# Patient Record
Sex: FEMALE | URBAN - METROPOLITAN AREA
[De-identification: names, ages, dates, MRNs, and addresses within clinical notes are randomized per-mention and may not be internally consistent; named-entity substitution may affect disease eponyms.]

---

## 2018-09-06 ENCOUNTER — CONSULT (OUTPATIENT)
Dept: URBAN - METROPOLITAN AREA CLINIC 19 | Facility: CLINIC | Age: 68
Setting detail: DERMATOLOGY
End: 2018-09-06

## 2018-09-06 DIAGNOSIS — L90.5 SCAR CONDITIONS AND FIBROSIS OF SKIN: ICD-10-CM

## 2018-09-06 DIAGNOSIS — Z85.828 PERSONAL HISTORY OF OTHER MALIGNANT NEOPLASM OF SKIN: ICD-10-CM

## 2018-09-06 DIAGNOSIS — L82.1 OTHER SEBORRHEIC KERATOSIS: ICD-10-CM

## 2018-09-06 DIAGNOSIS — Z85.820 PERSONAL HISTORY OF MALIGNANT MELANOMA OF SKIN: ICD-10-CM

## 2018-09-06 DIAGNOSIS — D18.01 HEMANGIOMA OF SKIN AND SUBCUTANEOUS TISSUE: ICD-10-CM

## 2018-09-06 DIAGNOSIS — L81.4 OTHER MELANIN HYPERPIGMENTATION: ICD-10-CM

## 2018-09-06 PROCEDURE — 11101 BIOPSY SKIN SUBQ&/MUCOUS MEMBRANE EA ADDL LESN: CPT

## 2018-09-06 PROCEDURE — 11100 BX SKIN SUBCUTANEOUS&/MUCOUS MEMBRANE 1 LESION: CPT

## 2018-09-06 PROCEDURE — 99202 OFFICE O/P NEW SF 15 MIN: CPT

## 2018-09-24 ENCOUNTER — SUTURE REMOVAL (OUTPATIENT)
Dept: URBAN - METROPOLITAN AREA CLINIC 19 | Facility: CLINIC | Age: 68
Setting detail: DERMATOLOGY
End: 2018-09-24

## 2018-09-24 DIAGNOSIS — Z48.02 ENCOUNTER FOR REMOVAL OF SUTURES: ICD-10-CM

## 2018-09-24 PROCEDURE — 99024 POSTOP FOLLOW-UP VISIT: CPT

## 2020-02-18 ENCOUNTER — APPOINTMENT (OUTPATIENT)
Age: 70
Setting detail: DERMATOLOGY
End: 2020-02-18

## 2020-02-18 DIAGNOSIS — L21.8 OTHER SEBORRHEIC DERMATITIS: ICD-10-CM

## 2020-02-18 DIAGNOSIS — B95.61 METHICILLIN SUSCEPTIBLE STAPHYLOCOCCUS AUREUS INFECTION AS THE CAUSE OF DISEASES CLASSIFIED ELSEWHERE: ICD-10-CM

## 2020-02-18 PROCEDURE — 99213 OFFICE O/P EST LOW 20 MIN: CPT

## 2020-02-18 PROCEDURE — OTHER PRESCRIPTION: OTHER

## 2020-02-18 PROCEDURE — OTHER COUNSELING: OTHER

## 2020-02-18 RX ORDER — TRIAMCINOLONE ACETONIDE 1 MG/G
CREAM TOPICAL
Qty: 1 | Refills: 3 | Status: ERX | COMMUNITY
Start: 2020-02-18

## 2020-02-18 RX ORDER — FLUOCINONIDE 0.5 MG/ML
SOLUTION TOPICAL
Qty: 1 | Refills: 5 | Status: ERX | COMMUNITY
Start: 2020-02-18

## 2020-02-18 RX ORDER — MUPIROCIN 20 MG/G
OINTMENT TOPICAL
Qty: 1 | Refills: 3 | Status: ERX | COMMUNITY
Start: 2020-02-18

## 2020-02-18 RX ORDER — DOXYCYCLINE HYCLATE 100 MG/1
CAPSULE, GELATIN COATED ORAL
Qty: 60 | Refills: 3 | Status: ACTIVE

## 2020-02-18 ASSESSMENT — LOCATION SIMPLE DESCRIPTION DERM
LOCATION SIMPLE: SCALP
LOCATION SIMPLE: LEFT THIGH
LOCATION SIMPLE: RIGHT SCALP
LOCATION SIMPLE: RIGHT THIGH

## 2020-02-18 ASSESSMENT — LOCATION ZONE DERM
LOCATION ZONE: LEG
LOCATION ZONE: SCALP

## 2020-02-18 ASSESSMENT — LOCATION DETAILED DESCRIPTION DERM
LOCATION DETAILED: RIGHT ANTERIOR PROXIMAL THIGH
LOCATION DETAILED: RIGHT CENTRAL FRONTAL SCALP
LOCATION DETAILED: RIGHT SUPERIOR PARIETAL SCALP
LOCATION DETAILED: LEFT ANTERIOR PROXIMAL THIGH
LOCATION DETAILED: RIGHT MEDIAL FRONTAL SCALP

## 2020-02-18 NOTE — HPI: SECONDARY COMPLAINT
How Severe Is This Condition?: moderate
Additional History: Had biopsy taken from right groin area confirmed staph infection, Dr Ren Elam put patient on doxycycline 100 mg bid. Areas are not healing, very tender and red. Has severe swelling in feet not sure if it’s related.

## 2020-04-07 ENCOUNTER — APPOINTMENT (OUTPATIENT)
Dept: URBAN - METROPOLITAN AREA CLINIC 273 | Age: 70
Setting detail: DERMATOLOGY
End: 2020-04-07

## 2020-04-07 DIAGNOSIS — L01.01 NON-BULLOUS IMPETIGO: ICD-10-CM

## 2020-04-07 DIAGNOSIS — L21.8 OTHER SEBORRHEIC DERMATITIS: ICD-10-CM

## 2020-04-07 PROCEDURE — OTHER ADDITIONAL NOTES: OTHER

## 2020-04-07 PROCEDURE — OTHER PRESCRIPTION: OTHER

## 2020-04-07 PROCEDURE — OTHER COUNSELING: OTHER

## 2020-04-07 PROCEDURE — 99213 OFFICE O/P EST LOW 20 MIN: CPT

## 2020-04-07 RX ORDER — SODIUM SULFACETAMIDE AND SULFUR 80; 40 MG/ML; MG/ML
LOTION TOPICAL
Qty: 1 | Refills: 3 | Status: ERX | COMMUNITY
Start: 2020-04-07

## 2020-04-07 ASSESSMENT — LOCATION DETAILED DESCRIPTION DERM
LOCATION DETAILED: LEFT INFERIOR CENTRAL MALAR CHEEK
LOCATION DETAILED: LEFT AXILLARY VAULT
LOCATION DETAILED: RIGHT AXILLARY VAULT

## 2020-04-07 ASSESSMENT — LOCATION SIMPLE DESCRIPTION DERM
LOCATION SIMPLE: LEFT CHEEK
LOCATION SIMPLE: RIGHT AXILLARY VAULT
LOCATION SIMPLE: LEFT AXILLARY VAULT

## 2020-04-07 ASSESSMENT — LOCATION ZONE DERM
LOCATION ZONE: AXILLAE
LOCATION ZONE: FACE

## 2020-04-07 NOTE — PROCEDURE: COUNSELING
Detail Level: Zone
Detail Level: Detailed
Patient Specific Counseling (Will Not Stick From Patient To Patient): Continue mupirocin oint—use on individual lesions bid till clear. Also use bid inside nose and under arms for 5 days.
Patient Specific Counseling (Will Not Stick From Patient To Patient): Informed patient to take ivermectin one dose and use sulfur wash to areas. Seb derm can sometimes feel like things crawling.

## 2020-05-27 ENCOUNTER — APPOINTMENT (OUTPATIENT)
Dept: URBAN - METROPOLITAN AREA CLINIC 273 | Age: 70
Setting detail: DERMATOLOGY
End: 2020-05-27

## 2020-05-27 ENCOUNTER — RX ONLY (RX ONLY)
Age: 70
End: 2020-05-27

## 2020-05-27 PROBLEM — R20.8 OTHER DISTURBANCES OF SKIN SENSATION: Status: ACTIVE | Noted: 2020-05-27

## 2020-05-27 PROCEDURE — OTHER PRESCRIPTION MEDICATION MANAGEMENT: OTHER

## 2020-05-27 PROCEDURE — 99213 OFFICE O/P EST LOW 20 MIN: CPT

## 2020-05-27 PROCEDURE — OTHER PRESCRIPTION: OTHER

## 2020-06-22 ENCOUNTER — RX ONLY (RX ONLY)
Age: 70
End: 2020-06-22

## 2020-06-22 RX ORDER — IVERMECTIN 3 MG/1
TABLET ORAL
Qty: 6 | Refills: 0 | Status: ERX

## 2020-06-29 ENCOUNTER — RX ONLY (RX ONLY)
Age: 70
End: 2020-06-29

## 2020-06-29 RX ORDER — PERMETHRIN 50 MG/G
CREAM TOPICAL
Qty: 1 | Refills: 1 | Status: ERX | COMMUNITY
Start: 2020-06-29

## 2021-09-08 ENCOUNTER — APPOINTMENT (OUTPATIENT)
Dept: URBAN - METROPOLITAN AREA CLINIC 273 | Age: 71
Setting detail: DERMATOLOGY
End: 2021-09-08

## 2021-09-08 DIAGNOSIS — L21.8 OTHER SEBORRHEIC DERMATITIS: ICD-10-CM

## 2021-09-08 DIAGNOSIS — F45.8 OTHER SOMATOFORM DISORDERS: ICD-10-CM

## 2021-09-08 PROBLEM — L30.9 DERMATITIS, UNSPECIFIED: Status: ACTIVE | Noted: 2021-09-08

## 2021-09-08 PROCEDURE — 99214 OFFICE O/P EST MOD 30 MIN: CPT

## 2021-09-08 PROCEDURE — OTHER COUNSELING: OTHER

## 2021-09-08 PROCEDURE — OTHER PRESCRIPTION: OTHER

## 2021-09-08 RX ORDER — RISPERIDONE 1 MG/1
TABLET ORAL
Qty: 21 | Refills: 1 | Status: ERX | COMMUNITY
Start: 2021-09-08

## 2021-09-08 RX ORDER — BETAMETHASONE VALERATE 1 MG/ML
LOTION TOPICAL
Qty: 60 | Refills: 5 | Status: ERX | COMMUNITY
Start: 2021-09-08

## 2021-09-08 NOTE — HPI: HAIR LOSS
How Did The Hair Loss Occur?: gradual in onset
How Severe Is Your Hair Loss?: mild
Additional History: No family history of hair loss

## 2021-09-09 ENCOUNTER — RX ONLY (RX ONLY)
Age: 71
End: 2021-09-09

## 2021-09-09 RX ORDER — MOMETASONE FUROATE 1 MG/ML
SOLUTION TOPICAL
Qty: 60 | Refills: 5 | Status: ERX | COMMUNITY
Start: 2021-09-09

## 2021-09-29 ENCOUNTER — APPOINTMENT (OUTPATIENT)
Dept: URBAN - METROPOLITAN AREA CLINIC 273 | Age: 71
Setting detail: DERMATOLOGY
End: 2021-09-29

## 2021-09-29 DIAGNOSIS — L28.1 PRURIGO NODULARIS: ICD-10-CM

## 2021-09-29 DIAGNOSIS — L21.8 OTHER SEBORRHEIC DERMATITIS: ICD-10-CM

## 2021-09-29 PROCEDURE — OTHER INTRALESIONAL KENALOG: OTHER

## 2021-09-29 PROCEDURE — OTHER COUNSELING: OTHER

## 2021-09-29 PROCEDURE — 99213 OFFICE O/P EST LOW 20 MIN: CPT | Mod: 25

## 2021-09-29 PROCEDURE — 11900 INJECT SKIN LESIONS </W 7: CPT

## 2021-09-29 ASSESSMENT — LOCATION ZONE DERM
LOCATION ZONE: SCALP
LOCATION ZONE: ARM

## 2021-09-29 ASSESSMENT — LOCATION DETAILED DESCRIPTION DERM
LOCATION DETAILED: HAIR
LOCATION DETAILED: LEFT PROXIMAL DORSAL FOREARM
LOCATION DETAILED: RIGHT PROXIMAL POSTERIOR UPPER ARM

## 2021-09-29 ASSESSMENT — LOCATION SIMPLE DESCRIPTION DERM
LOCATION SIMPLE: LEFT FOREARM
LOCATION SIMPLE: HAIR
LOCATION SIMPLE: RIGHT POSTERIOR UPPER ARM

## 2021-09-29 NOTE — PROCEDURE: INTRALESIONAL KENALOG
Concentration Of Solution Injected (Mg/Ml): 2.5
Consent: The risks of atrophy were reviewed with the patient.
X Size Of Lesion In Cm (Optional): 0
Include Z78.9 (Other Specified Conditions Influencing Health Status) As An Associated Diagnosis?: No
Detail Level: Simple
Validate Note Data When Using Inventory: Yes
Kenalog Preparation: Kenalog
Medical Necessity Clause: This procedure was medically necessary because the lesions that were treated were:
Total Volume Injected (Ccs- Only Use Numbers And Decimals): 6

## 2021-12-22 ENCOUNTER — APPOINTMENT (OUTPATIENT)
Dept: URBAN - METROPOLITAN AREA CLINIC 273 | Age: 71
Setting detail: DERMATOLOGY
End: 2021-12-22

## 2021-12-22 DIAGNOSIS — L57.0 ACTINIC KERATOSIS: ICD-10-CM

## 2021-12-22 DIAGNOSIS — L21.8 OTHER SEBORRHEIC DERMATITIS: ICD-10-CM

## 2021-12-22 PROCEDURE — OTHER LIQUID NITROGEN: OTHER

## 2021-12-22 PROCEDURE — 99213 OFFICE O/P EST LOW 20 MIN: CPT | Mod: 25

## 2021-12-22 PROCEDURE — OTHER COUNSELING: OTHER

## 2021-12-22 PROCEDURE — 17000 DESTRUCT PREMALG LESION: CPT

## 2021-12-22 PROCEDURE — OTHER PRESCRIPTION: OTHER

## 2021-12-22 RX ORDER — FLUOCINOLONE ACETONIDE 0.11 MG/ML
OIL TOPICAL
Qty: 118.28 | Refills: 11 | Status: ERX | COMMUNITY
Start: 2021-12-22

## 2021-12-22 RX ORDER — KETOCONAZOLE 20 MG/ML
SHAMPOO, SUSPENSION TOPICAL
Qty: 120 | Refills: 0 | Status: ERX | COMMUNITY
Start: 2021-12-22

## 2021-12-22 ASSESSMENT — LOCATION DETAILED DESCRIPTION DERM: LOCATION DETAILED: SUPERIOR THORACIC SPINE

## 2021-12-22 ASSESSMENT — LOCATION ZONE DERM: LOCATION ZONE: TRUNK

## 2021-12-22 ASSESSMENT — LOCATION SIMPLE DESCRIPTION DERM: LOCATION SIMPLE: UPPER BACK

## 2021-12-22 NOTE — PROCEDURE: LIQUID NITROGEN
Consent: The patient's consent was obtained including but not limited to risks of crusting, scabbing, blistering, scarring, darker or lighter pigmentary change, recurrence, incomplete removal and infection.
Render Post-Care Instructions In Note?: no
Detail Level: Simple
Number Of Freeze-Thaw Cycles: 1 freeze-thaw cycle
Duration Of Freeze Thaw-Cycle (Seconds): 2
Post-Care Instructions: I reviewed with the patient in detail post-care instructions. Patient is to wear sunprotection, and avoid picking at any of the treated lesions. Pt may apply Vaseline to crusted or scabbing areas.
Show Aperture Variable?: Yes

## 2022-04-07 ENCOUNTER — RX ONLY (RX ONLY)
Age: 72
End: 2022-04-07

## 2022-04-07 ENCOUNTER — APPOINTMENT (OUTPATIENT)
Dept: URBAN - METROPOLITAN AREA CLINIC 273 | Age: 72
Setting detail: DERMATOLOGY
End: 2022-04-07

## 2022-04-07 DIAGNOSIS — L71.8 OTHER ROSACEA: ICD-10-CM

## 2022-04-07 DIAGNOSIS — F45.8 OTHER SOMATOFORM DISORDERS: ICD-10-CM

## 2022-04-07 PROCEDURE — OTHER COUNSELING: OTHER

## 2022-04-07 PROCEDURE — 99214 OFFICE O/P EST MOD 30 MIN: CPT

## 2022-04-07 PROCEDURE — OTHER PRESCRIPTION: OTHER

## 2022-04-07 PROCEDURE — OTHER PRESCRIPTION MEDICATION MANAGEMENT: OTHER

## 2022-04-07 RX ORDER — DOXYCYCLINE HYCLATE 100 MG/1
CAPSULE, GELATIN COATED ORAL
Qty: 60 | Refills: 3 | Status: CANCELLED

## 2022-04-07 RX ORDER — DOXYCYCLINE HYCLATE 100 MG/1
CAPSULE, GELATIN COATED ORAL
Qty: 60 | Refills: 5 | Status: ERX

## 2022-04-07 RX ORDER — DOXYCYCLINE HYCLATE 100 MG/1
CAPSULE, GELATIN COATED ORAL
Qty: 60 | Refills: 11 | Status: ERX | COMMUNITY
Start: 2022-04-07

## 2022-06-13 ENCOUNTER — APPOINTMENT (OUTPATIENT)
Dept: URBAN - METROPOLITAN AREA CLINIC 273 | Age: 72
Setting detail: DERMATOLOGY
End: 2022-06-13

## 2022-06-13 DIAGNOSIS — L71.8 OTHER ROSACEA: ICD-10-CM

## 2022-06-13 DIAGNOSIS — F45.8 OTHER SOMATOFORM DISORDERS: ICD-10-CM

## 2022-06-13 PROCEDURE — OTHER PRESCRIPTION MEDICATION MANAGEMENT: OTHER

## 2022-06-13 PROCEDURE — 99214 OFFICE O/P EST MOD 30 MIN: CPT

## 2022-06-13 PROCEDURE — OTHER PRESCRIPTION: OTHER

## 2022-06-13 RX ORDER — IVERMECTIN 3 MG/1
TABLET ORAL
Qty: 10 | Refills: 5 | Status: ERX | COMMUNITY
Start: 2022-06-13

## 2022-06-13 ASSESSMENT — LOCATION ZONE DERM: LOCATION ZONE: SCALP

## 2022-06-13 ASSESSMENT — LOCATION SIMPLE DESCRIPTION DERM: LOCATION SIMPLE: SCALP

## 2022-06-13 ASSESSMENT — LOCATION DETAILED DESCRIPTION DERM: LOCATION DETAILED: LEFT CENTRAL OCCIPITAL SCALP

## 2022-08-01 ENCOUNTER — APPOINTMENT (OUTPATIENT)
Dept: URBAN - METROPOLITAN AREA CLINIC 273 | Age: 72
Setting detail: DERMATOLOGY
End: 2022-08-01

## 2022-08-01 DIAGNOSIS — F45.8 OTHER SOMATOFORM DISORDERS: ICD-10-CM

## 2022-08-01 DIAGNOSIS — L71.0 PERIORAL DERMATITIS: ICD-10-CM

## 2022-08-01 PROCEDURE — OTHER MIPS QUALITY: OTHER

## 2022-08-01 PROCEDURE — 99214 OFFICE O/P EST MOD 30 MIN: CPT

## 2022-08-01 PROCEDURE — OTHER COUNSELING: OTHER

## 2022-08-01 PROCEDURE — OTHER PRESCRIPTION: OTHER

## 2022-08-01 RX ORDER — DOXYCYCLINE HYCLATE 100 MG/1
CAPSULE, GELATIN COATED ORAL
Qty: 60 | Refills: 0 | Status: ERX

## 2022-08-01 RX ORDER — PIMOZIDE 1 MG/1
TABLET ORAL
Qty: 30 | Refills: 0 | Status: ERX | COMMUNITY
Start: 2022-08-01

## 2022-08-01 ASSESSMENT — LOCATION ZONE DERM: LOCATION ZONE: EYELID

## 2022-08-01 ASSESSMENT — LOCATION SIMPLE DESCRIPTION DERM: LOCATION SIMPLE: LEFT INFERIOR EYELID

## 2022-08-01 ASSESSMENT — LOCATION DETAILED DESCRIPTION DERM: LOCATION DETAILED: LEFT MEDIAL INFERIOR EYELID

## 2022-08-01 NOTE — PROCEDURE: MIPS QUALITY
Quality 431: Preventive Care And Screening: Unhealthy Alcohol Use - Screening: Patient not identified as an unhealthy alcohol user when screened for unhealthy alcohol use using a systematic screening method
Detail Level: Simple
Quality 110: Preventive Care And Screening: Influenza Immunization: Influenza Immunization previously received during influenza season
Quality 402: Tobacco Use And Help With Quitting Among Adolescents: Patient screened for tobacco and never smoked
Quality 226: Preventive Care And Screening: Tobacco Use: Screening And Cessation Intervention: Patient screened for tobacco use and is an ex/non-smoker

## 2022-09-01 ENCOUNTER — APPOINTMENT (OUTPATIENT)
Dept: URBAN - METROPOLITAN AREA CLINIC 273 | Age: 72
Setting detail: DERMATOLOGY
End: 2022-09-01

## 2022-09-01 DIAGNOSIS — L28.1 PRURIGO NODULARIS: ICD-10-CM

## 2022-09-01 DIAGNOSIS — L01.01 NON-BULLOUS IMPETIGO: ICD-10-CM

## 2022-09-01 DIAGNOSIS — L72.0 EPIDERMAL CYST: ICD-10-CM

## 2022-09-01 DIAGNOSIS — L82.0 INFLAMED SEBORRHEIC KERATOSIS: ICD-10-CM

## 2022-09-01 PROCEDURE — 99213 OFFICE O/P EST LOW 20 MIN: CPT | Mod: 25

## 2022-09-01 PROCEDURE — 17110 DESTRUCT B9 LESION 1-14: CPT

## 2022-09-01 PROCEDURE — OTHER INTRALESIONAL KENALOG: OTHER

## 2022-09-01 PROCEDURE — OTHER LIQUID NITROGEN: OTHER

## 2022-09-01 PROCEDURE — OTHER PRESCRIPTION: OTHER

## 2022-09-01 PROCEDURE — 11900 INJECT SKIN LESIONS </W 7: CPT | Mod: 59

## 2022-09-01 PROCEDURE — OTHER COUNSELING: OTHER

## 2022-09-01 RX ORDER — MUPIROCIN 20 MG/G
OINTMENT TOPICAL
Qty: 22 | Refills: 3 | Status: ERX | COMMUNITY
Start: 2022-09-01

## 2022-09-01 ASSESSMENT — LOCATION DETAILED DESCRIPTION DERM
LOCATION DETAILED: LEFT LATERAL CANTHUS
LOCATION DETAILED: LEFT SUPERIOR MEDIAL MALAR CHEEK
LOCATION DETAILED: RIGHT POSTERIOR SHOULDER
LOCATION DETAILED: LEFT LATERAL TRAPEZIAL NECK

## 2022-09-01 ASSESSMENT — LOCATION SIMPLE DESCRIPTION DERM
LOCATION SIMPLE: POSTERIOR NECK
LOCATION SIMPLE: LEFT CHEEK
LOCATION SIMPLE: RIGHT SHOULDER
LOCATION SIMPLE: LEFT EYELID

## 2022-09-01 ASSESSMENT — LOCATION ZONE DERM
LOCATION ZONE: EYELID
LOCATION ZONE: FACE
LOCATION ZONE: ARM
LOCATION ZONE: NECK

## 2022-09-01 NOTE — PROCEDURE: INTRALESIONAL KENALOG
Which Kenalog Vial Was Used?: Kenalog 10 mg/ml (5 ml vial)
Validate Note Data When Using Inventory: Yes
Consent: The risks of atrophy were reviewed with the patient.
How Many Mls Were Removed From The 80 Mg/Ml (5ml) Vial When Preparing The Injectable Solution?: 0
Include Z78.9 (Other Specified Conditions Influencing Health Status) As An Associated Diagnosis?: No
Kenalog Preparation: Kenalog
Administered By (Optional): Abbe Montgomery NP
Detail Level: Detailed
Total Volume Injected (Ccs- Only Use Numbers And Decimals): 0.3
Concentration Of Solution Injected (Mg/Ml): 2.5
Medical Necessity Clause: This procedure was medically necessary because the lesions that were treated were:

## 2022-09-01 NOTE — PROCEDURE: LIQUID NITROGEN
Spray Paint Technique: No
Show Spray Paint Technique Variable?: Yes
Medical Necessity Information: It is in your best interest to select a reason for this procedure from the list below. All of these items fulfill various CMS LCD requirements except the new and changing color options.
Medical Necessity Clause: This procedure was medically necessary because the lesions that were treated were:
Detail Level: Detailed
Spray Paint Text: The liquid nitrogen was applied to the skin utilizing a spray paint frosting technique.
Consent: The patient's consent was obtained including but not limited to risks of crusting, scabbing, blistering, scarring, darker or lighter pigmentary change, recurrence, incomplete removal and infection.
Post-Care Instructions: I reviewed with the patient in detail post-care instructions. Patient is to wear sunprotection, and avoid picking at any of the treated lesions. Pt may apply Vaseline to crusted or scabbing areas.

## 2022-10-03 ENCOUNTER — RX ONLY (RX ONLY)
Age: 72
End: 2022-10-03

## 2022-10-03 ENCOUNTER — APPOINTMENT (OUTPATIENT)
Dept: URBAN - METROPOLITAN AREA CLINIC 273 | Age: 72
Setting detail: DERMATOLOGY
End: 2022-10-03

## 2022-10-03 DIAGNOSIS — F45.8 OTHER SOMATOFORM DISORDERS: ICD-10-CM

## 2022-10-03 DIAGNOSIS — L29.8 OTHER PRURITUS: ICD-10-CM

## 2022-10-03 DIAGNOSIS — T07XXXA ABRASION OR FRICTION BURN OF OTHER, MULTIPLE, AND UNSPECIFIED SITES, WITHOUT MENTION OF INFECTION: ICD-10-CM

## 2022-10-03 PROBLEM — S30.811A ABRASION OF ABDOMINAL WALL, INITIAL ENCOUNTER: Status: ACTIVE | Noted: 2022-10-03

## 2022-10-03 PROBLEM — S40.212A ABRASION OF LEFT SHOULDER, INITIAL ENCOUNTER: Status: ACTIVE | Noted: 2022-10-03

## 2022-10-03 PROCEDURE — OTHER PRESCRIPTION MEDICATION MANAGEMENT: OTHER

## 2022-10-03 PROCEDURE — OTHER COUNSELING: OTHER

## 2022-10-03 PROCEDURE — 99214 OFFICE O/P EST MOD 30 MIN: CPT

## 2022-10-03 RX ORDER — IVERMECTIN 3 MG/1
TABLET ORAL
Qty: 10 | Refills: 5 | Status: ERX

## 2022-10-03 RX ORDER — PIMOZIDE 1 MG/1
TABLET ORAL
Qty: 35 | Refills: 1 | Status: ERX

## 2022-10-03 ASSESSMENT — LOCATION ZONE DERM
LOCATION ZONE: LEG
LOCATION ZONE: ARM
LOCATION ZONE: TRUNK

## 2022-10-03 ASSESSMENT — LOCATION DETAILED DESCRIPTION DERM
LOCATION DETAILED: LEFT DISTAL PRETIBIAL REGION
LOCATION DETAILED: RIGHT DISTAL PRETIBIAL REGION
LOCATION DETAILED: LEFT RIB CAGE
LOCATION DETAILED: RIGHT RIB CAGE
LOCATION DETAILED: LEFT POSTERIOR SHOULDER
LOCATION DETAILED: EPIGASTRIC SKIN

## 2022-10-03 ASSESSMENT — LOCATION SIMPLE DESCRIPTION DERM
LOCATION SIMPLE: RIGHT PRETIBIAL REGION
LOCATION SIMPLE: LEFT PRETIBIAL REGION
LOCATION SIMPLE: ABDOMEN
LOCATION SIMPLE: LEFT SHOULDER

## 2022-10-08 ENCOUNTER — RX ONLY (RX ONLY)
Age: 72
End: 2022-10-08

## 2022-10-08 RX ORDER — PIMOZIDE 1 MG/1
TABLET ORAL
Qty: 105 | Refills: 1 | Status: ERX | COMMUNITY
Start: 2022-10-08

## 2023-01-17 NOTE — PROCEDURE: PRESCRIPTION MEDICATION MANAGEMENT
Render In Strict Bullet Format?: No
Initiate Treatment: Pimizide, pt stated she never picked up
Detail Level: Simple
Initiate Treatment: Mupirocin
details…
mouth/pharynx/neck

## 2023-10-05 ENCOUNTER — APPOINTMENT (OUTPATIENT)
Dept: URBAN - METROPOLITAN AREA CLINIC 306 | Age: 73
Setting detail: DERMATOLOGY
End: 2023-10-05

## 2023-10-05 DIAGNOSIS — F45.8 OTHER SOMATOFORM DISORDERS: ICD-10-CM

## 2023-10-05 PROBLEM — L29.9 PRURITUS, UNSPECIFIED: Status: ACTIVE | Noted: 2023-10-05

## 2023-10-05 PROCEDURE — OTHER COUNSELING: OTHER

## 2023-10-05 PROCEDURE — 99214 OFFICE O/P EST MOD 30 MIN: CPT

## 2023-10-05 PROCEDURE — OTHER PRESCRIPTION: OTHER

## 2023-10-05 PROCEDURE — OTHER ADDITIONAL NOTES: OTHER

## 2023-10-05 RX ORDER — TRIAMCINOLONE ACETONIDE 1 MG/G
CREAM TOPICAL BID
Qty: 453.6 | Refills: 3 | Status: ERX | COMMUNITY
Start: 2023-10-05

## 2023-10-05 RX ORDER — IVERMECTIN 3 MG/1
TABLET ORAL
Qty: 12 | Refills: 0 | Status: ERX | COMMUNITY
Start: 2023-10-05

## 2023-10-05 NOTE — PROCEDURE: ADDITIONAL NOTES
Render Risk Assessment In Note?: no
Additional Notes: Patient never picked up pimozide, she states she never even knew about it. Carmen advised to follow up with dr sloan in 4 weeks
Detail Level: Simple

## 2023-11-20 ENCOUNTER — APPOINTMENT (OUTPATIENT)
Dept: URBAN - METROPOLITAN AREA CLINIC 306 | Age: 73
Setting detail: DERMATOLOGY
End: 2023-11-20

## 2023-11-20 DIAGNOSIS — H61.03 CHONDRITIS OF EXTERNAL EAR: ICD-10-CM

## 2023-11-20 DIAGNOSIS — F45.8 OTHER SOMATOFORM DISORDERS: ICD-10-CM

## 2023-11-20 PROBLEM — H61.031 CHONDRITIS OF RIGHT EXTERNAL EAR: Status: ACTIVE | Noted: 2023-11-20

## 2023-11-20 PROBLEM — L29.9 PRURITUS, UNSPECIFIED: Status: ACTIVE | Noted: 2023-11-20

## 2023-11-20 PROCEDURE — OTHER COUNSELING: OTHER

## 2023-11-20 PROCEDURE — OTHER PRESCRIPTION: OTHER

## 2023-11-20 PROCEDURE — OTHER ORDER TESTS: OTHER

## 2023-11-20 PROCEDURE — 99214 OFFICE O/P EST MOD 30 MIN: CPT

## 2023-11-20 PROCEDURE — OTHER ADDITIONAL NOTES: OTHER

## 2023-11-20 RX ORDER — TRIAMCINOLONE ACETONIDE 1 MG/G
CREAM TOPICAL BID
Qty: 453.6 | Refills: 3 | Status: ACTIVE

## 2023-11-20 RX ORDER — IVERMECTIN 3 MG/1
TABLET ORAL
Qty: 12 | Refills: 0 | Status: ERX

## 2023-11-20 ASSESSMENT — LOCATION SIMPLE DESCRIPTION DERM
LOCATION SIMPLE: LEFT EAR
LOCATION SIMPLE: RIGHT EAR

## 2023-11-20 ASSESSMENT — LOCATION DETAILED DESCRIPTION DERM
LOCATION DETAILED: LEFT CAVUM CONCHA
LOCATION DETAILED: RIGHT CYMBA CONCHA

## 2023-11-20 ASSESSMENT — LOCATION ZONE DERM
LOCATION ZONE: EAR
LOCATION ZONE: EAR

## 2023-11-20 NOTE — PROCEDURE: ORDER TESTS
Clinical Notes (To The Lab): Patient collected from left ear
Bill For Surgical Tray: no
Expected Date Of Service: 11/20/2023
Billing Type: Third-Party Bill
Performing Laboratory: [id number]

## 2024-06-19 ENCOUNTER — APPOINTMENT (OUTPATIENT)
Dept: URBAN - METROPOLITAN AREA CLINIC 306 | Age: 74
Setting detail: DERMATOLOGY
End: 2024-06-19

## 2024-06-19 DIAGNOSIS — L65.0 TELOGEN EFFLUVIUM: ICD-10-CM

## 2024-06-19 PROBLEM — L65.9 NONSCARRING HAIR LOSS, UNSPECIFIED: Status: ACTIVE | Noted: 2024-06-19

## 2024-06-19 PROCEDURE — OTHER COUNSELING: OTHER

## 2024-06-19 PROCEDURE — 99214 OFFICE O/P EST MOD 30 MIN: CPT

## 2024-06-19 PROCEDURE — OTHER PRESCRIPTION: OTHER

## 2024-06-19 RX ORDER — MINOXIDIL 2.5 MG/1
TABLET ORAL
Qty: 90 | Refills: 3 | Status: ERX | COMMUNITY
Start: 2024-06-19

## 2024-06-19 ASSESSMENT — LOCATION DETAILED DESCRIPTION DERM: LOCATION DETAILED: MID-FRONTAL SCALP

## 2024-06-19 ASSESSMENT — LOCATION ZONE DERM: LOCATION ZONE: SCALP

## 2024-06-19 ASSESSMENT — LOCATION SIMPLE DESCRIPTION DERM: LOCATION SIMPLE: ANTERIOR SCALP
